# Patient Record
Sex: FEMALE | Race: WHITE | NOT HISPANIC OR LATINO | URBAN - METROPOLITAN AREA
[De-identification: names, ages, dates, MRNs, and addresses within clinical notes are randomized per-mention and may not be internally consistent; named-entity substitution may affect disease eponyms.]

---

## 2022-12-30 ENCOUNTER — TRANSFERRED RECORDS (OUTPATIENT)
Dept: HEALTH INFORMATION MANAGEMENT | Facility: CLINIC | Age: 22
End: 2022-12-30

## 2022-12-30 ENCOUNTER — MEDICAL CORRESPONDENCE (OUTPATIENT)
Dept: HEALTH INFORMATION MANAGEMENT | Facility: CLINIC | Age: 22
End: 2022-12-30

## 2023-01-03 ENCOUNTER — TRANSCRIBE ORDERS (OUTPATIENT)
Dept: OTHER | Age: 23
End: 2023-01-03

## 2023-01-03 DIAGNOSIS — G43.009 MIGRAINE WITHOUT AURA AND WITHOUT STATUS MIGRAINOSUS, NOT INTRACTABLE: Primary | ICD-10-CM

## 2023-03-23 NOTE — TELEPHONE ENCOUNTER
Records Requested     March 23, 2023 9:58 AM   [id number]   Facility  [facility] P#[phone]  [address] [place] [address]    Outcome 10:01am Sent request for imaging disc.  Tracking # [id number]   -PETER     Tiffany Orantes on 6/13/2023 at 2:59 PM   Action Taken Called to follow up on imaging request. Was informed that the imaging was originally pushed to PACS on March 25. [facility] is re-pushing imaging to PACS. -PETER    Dena Orantes on 6/13/2023 at 3:11 PM Imaging resolved into PACS. -PETER       RECORDS RECEIVED FROM: Care Everywhere   REASON FOR VISIT: Migraine without aura and without status migrainosus, not intractable   Date of Appt: 6/20/23   NOTES (FOR ALL VISITS) STATUS DETAILS   OFFICE NOTE from referring provider Care Everywhere 1/3/23, 12/30/22, 9/23/22 Farhat Santillan @[facility]       OFFICE NOTE from other specialist Care Everywhere 3/16/23 Jose Wesley MD @[facility]   DISCHARGE REPORT from the ER Care Everywhere 11/27/22 Goldberg, Samantha J, RN @ [facility] ED   MEDICATION LIST Care Everywhere    IMAGING  (FOR ALL VISITS)     CT (HEAD, NECK, SPINE) PACS [facility]  2/28/19 CT Head

## 2023-03-28 ENCOUNTER — HOSPITAL ENCOUNTER (EMERGENCY)
Facility: CLINIC | Age: 23
Discharge: HOME | End: 2023-03-28
Attending: EMERGENCY MEDICINE | Admitting: EMERGENCY MEDICINE
Payer: COMMERCIAL

## 2023-03-28 VITALS
HEART RATE: 91 BPM | OXYGEN SATURATION: 98 % | DIASTOLIC BLOOD PRESSURE: 81 MMHG | SYSTOLIC BLOOD PRESSURE: 124 MMHG | RESPIRATION RATE: 18 BRPM | TEMPERATURE: 98 F

## 2023-03-28 DIAGNOSIS — G44.219 EPISODIC TENSION-TYPE HEADACHE, NOT INTRACTABLE: ICD-10-CM

## 2023-03-28 LAB
HOLD SPECIMEN: NORMAL

## 2023-03-28 PROCEDURE — 250N000011 HC RX IP 250 OP 636: Performed by: EMERGENCY MEDICINE

## 2023-03-28 PROCEDURE — 99284 EMERGENCY DEPT VISIT MOD MDM: CPT | Mod: 25 | Performed by: EMERGENCY MEDICINE

## 2023-03-28 PROCEDURE — 96361 HYDRATE IV INFUSION ADD-ON: CPT | Performed by: EMERGENCY MEDICINE

## 2023-03-28 PROCEDURE — 96374 THER/PROPH/DIAG INJ IV PUSH: CPT | Performed by: EMERGENCY MEDICINE

## 2023-03-28 PROCEDURE — 258N000003 HC RX IP 258 OP 636: Performed by: EMERGENCY MEDICINE

## 2023-03-28 PROCEDURE — 99283 EMERGENCY DEPT VISIT LOW MDM: CPT | Performed by: EMERGENCY MEDICINE

## 2023-03-28 PROCEDURE — 96375 TX/PRO/DX INJ NEW DRUG ADDON: CPT | Performed by: EMERGENCY MEDICINE

## 2023-03-28 RX ORDER — KETOROLAC TROMETHAMINE 30 MG/ML
30 INJECTION, SOLUTION INTRAMUSCULAR; INTRAVENOUS ONCE
Status: COMPLETED | OUTPATIENT
Start: 2023-03-28 | End: 2023-03-28

## 2023-03-28 RX ORDER — PROPRANOLOL HYDROCHLORIDE 20 MG/1
20 TABLET ORAL
COMMUNITY
Start: 2023-03-16

## 2023-03-28 RX ORDER — SUMATRIPTAN 5 MG/1
10 SPRAY NASAL
COMMUNITY
Start: 2023-03-16

## 2023-03-28 RX ORDER — SUMATRIPTAN 100 MG/1
100 TABLET, FILM COATED ORAL
COMMUNITY
Start: 2022-12-30

## 2023-03-28 RX ADMIN — KETOROLAC TROMETHAMINE 30 MG: 30 INJECTION, SOLUTION INTRAMUSCULAR; INTRAVENOUS at 04:05

## 2023-03-28 RX ADMIN — PROCHLORPERAZINE EDISYLATE 10 MG: 5 INJECTION INTRAMUSCULAR; INTRAVENOUS at 04:05

## 2023-03-28 RX ADMIN — SODIUM CHLORIDE 1000 ML: 9 INJECTION, SOLUTION INTRAVENOUS at 04:02

## 2023-03-28 ASSESSMENT — ACTIVITIES OF DAILY LIVING (ADL): ADLS_ACUITY_SCORE: 33

## 2023-03-28 NOTE — DISCHARGE INSTRUCTIONS
Thank you for your patience today.  Please follow-up with your regular doctor in the next 2-3 days for further evaluation and follow-up care.  Please call to schedule an appointment.  Please continue your own medications.  Please rest, drink plenty of fluids.  Please return to the ER if you develop any worsening of your current symptoms.  It was a pleasure taking care of you today.  We hope you feel better soon.

## 2023-03-28 NOTE — ED PROVIDER NOTES
"ED Provider Note  [facility]      History     Chief Complaint   Patient presents with     Headache     HPI  Amaya Francis is a 22 year old female who has a past medical history of migraine headaches who presents the emergency department for evaluation of a migraine.  Patient reports that the past few days she has had allergy and sinus symptoms.  Patient states that she typically gets really bad allergy symptoms this time a year.  Patient states that overnight she developed a migraine.  Patient describes migraine as her typical migraine however more severe headache in the back of her head.  Patient reports associated photophobia, nausea but denies any vomiting.  Patient denies any fever, chills, weakness, paresthesias.  Patient takes propanolol daily for prevention and also took Imitrex to help abort the migraine however this did not help so came in for further evaluation.  No other complaints.    Past Medical History  No past medical history on file.  No past surgical history on file.  propranolol (INDERAL) 20 MG tablet  SUMAtriptan (IMITREX) 100 MG tablet  SUMAtriptan (IMITREX) 5 MG/ACT nasal spray      Allergies   Allergen Reactions     Cephalexin Other (See Comments) and Rash     Mom does not remember.  Mother told \"maybe allergic\", as toddler       Amitriptyline Anxiety     Family History  No family history on file.  Social History       Past medical history, past surgical history, medications, allergies, family history, and social history were reviewed with the patient. No additional pertinent items.      A medically appropriate review of systems was performed with pertinent positives and negatives noted in the HPI, and all other systems negative.    Physical Exam   BP: 124/81  Pulse: 91  Temp: 98  F (36.7  C)  Resp: 18  SpO2: 98 %  Physical Exam  General: Afebrile, no acute distress   HEENT: Normocephalic, atraumatic, conjunctivae normal. MMM  Neck: non-tender, supple  Cardio: " regular rate. regular rhythm   Resp: Normal work of breathing, no respiratory distress, lungs clear bilaterally, no wheezing, rhonchi, rales  Chest/Back: no visual signs of trauma, no CVA tenderness   Abdomen: soft, non distension, no tenderness, no peritoneal signs   Neuro: alert and fully oriented. CN II-XII grossly intact. Grossly normal strength and sensation in all extremities.   MSK: no deformities. Normal range of motion  Integumentary/Skin: no rash visualized, normal color  Psych: normal affect, normal behavior      ED Course, Procedures, & Data      Procedures    Results for orders placed or performed during the hospital encounter of 03/28/23   [place] Draw     Status: None    Narrative    The following orders were created for panel order [place] Draw.  Procedure                               Abnormality         Status                     ---------                               -----------         ------                     Extra Blue Top Tube[[id number]]                              Final result               Extra Red Top Tube[[id number]]                               Final result               Extra Green Top (Lithium...[[id number]]                      Final result               Extra Purple Top Tube[[id number]]                            Final result                 Please view results for these tests on the individual orders.   Extra Blue Top Tube     Status: None   Result Value Ref Range    Hold Specimen JIC    Extra Red Top Tube     Status: None   Result Value Ref Range    Hold Specimen JIC    Extra Green Top (Lithium Heparin) Tube     Status: None   Result Value Ref Range    Hold Specimen JIC    Extra Purple Top Tube     Status: None   Result Value Ref Range    Hold Specimen JIC      Medications   prochlorperazine (COMPAZINE) injection 10 mg (10 mg Intravenous $Given 3/28/23 0405)   ketorolac (TORADOL) injection 30 mg (30 mg Intravenous $Given 3/28/23 0405)   0.9% sodium chloride BOLUS (0 mLs  Intravenous Stopped 3/28/23 0550)     Labs Ordered and Resulted from Time of ED Arrival to Time of ED Departure - No data to display  No orders to display          Critical care was not performed.     Medical Decision Making  The patient's presentation was of moderate complexity (an acute illness with systemic symptoms).    The patient's evaluation involved:  history and exam without other MDM data elements    The patient's management necessitated moderate risk (prescription drug management including medications given in the ED).      Assessment & Plan    Amaya Francis is a 22 year old female who has a past medical history of migraine headaches who presents the emergency department for evaluation of a migraine.  Upon arrival patient is nontoxic-appearing, afebrile, moderate distress secondary to headache.  Patient with no red flags, no fever, no focal neurological deficit.  Reports similar migraines in the past.  Upon arrival an IV was established, patient was treated with IV Toradol, Compazine, and IV hydration.    On reevaluation patient resting comfortably, sleeping, no distress.  Patient states that she feels better with complete resolution of her symptoms and would like to go home and continue sleeping/resting.  Plan for discharge, encourage close outpatient follow-up, oral hydration, rest, continue home medications as previously directed.  Return precautions discussed.  Patient understands and agrees with plan.    I have reviewed the nursing notes. I have reviewed the findings, diagnosis, plan and need for follow up with the patient.    New Prescriptions    No medications on file       Final diagnoses:   Episodic tension-type headache, not intractable       Teetee Espino MD   [facility] EMERGENCY DEPARTMENT  3/28/2023     Teetee Espino MD  03/28/23 0601

## 2023-03-28 NOTE — ED TRIAGE NOTES
Pt reports hx of migraines w/ migraine starting tonight and not relieved by her imitrex. Pt reports recently starting propranolol for her migraines as well.  Pt reports this migraine started in her face rather than in the back of her like her typical migraine.  Pt reports recent sinus congestion as well.      Triage Assessment     Row Name 03/28/23 0346       Triage Assessment (Adult)    Airway WDL WDL       Respiratory WDL    Respiratory WDL WDL       Skin Circulation/Temperature WDL    Skin Circulation/Temperature WDL WDL       Cardiac WDL    Cardiac WDL WDL       Peripheral/Neurovascular WDL    Peripheral Neurovascular WDL WDL       Cognitive/Neuro/Behavioral WDL    Cognitive/Neuro/Behavioral WDL WDL

## 2023-06-20 ENCOUNTER — PRE VISIT (OUTPATIENT)
Dept: NEUROLOGY | Facility: CLINIC | Age: 23
End: 2023-06-20

## 2023-10-16 ENCOUNTER — HOSPITAL ENCOUNTER (EMERGENCY)
Facility: CLINIC | Age: 23
Discharge: HOME | End: 2023-10-16
Admitting: PHYSICIAN ASSISTANT
Payer: COMMERCIAL

## 2023-10-16 VITALS
TEMPERATURE: 98.2 F | OXYGEN SATURATION: 98 % | HEART RATE: 88 BPM | SYSTOLIC BLOOD PRESSURE: 118 MMHG | RESPIRATION RATE: 16 BRPM | DIASTOLIC BLOOD PRESSURE: 82 MMHG

## 2023-10-16 DIAGNOSIS — G43.909 MIGRAINE WITHOUT STATUS MIGRAINOSUS, NOT INTRACTABLE, UNSPECIFIED MIGRAINE TYPE: ICD-10-CM

## 2023-10-16 PROCEDURE — 99284 EMERGENCY DEPT VISIT MOD MDM: CPT | Mod: 25

## 2023-10-16 PROCEDURE — 96374 THER/PROPH/DIAG INJ IV PUSH: CPT

## 2023-10-16 PROCEDURE — 99284 EMERGENCY DEPT VISIT MOD MDM: CPT | Performed by: PHYSICIAN ASSISTANT

## 2023-10-16 PROCEDURE — 250N000011 HC RX IP 250 OP 636: Mod: JZ | Performed by: PHYSICIAN ASSISTANT

## 2023-10-16 PROCEDURE — 258N000003 HC RX IP 258 OP 636: Performed by: PHYSICIAN ASSISTANT

## 2023-10-16 PROCEDURE — 96375 TX/PRO/DX INJ NEW DRUG ADDON: CPT

## 2023-10-16 RX ORDER — METOCLOPRAMIDE HYDROCHLORIDE 5 MG/ML
10 INJECTION INTRAMUSCULAR; INTRAVENOUS ONCE
Status: COMPLETED | OUTPATIENT
Start: 2023-10-16 | End: 2023-10-16

## 2023-10-16 RX ORDER — KETOROLAC TROMETHAMINE 15 MG/ML
15 INJECTION, SOLUTION INTRAMUSCULAR; INTRAVENOUS ONCE
Status: COMPLETED | OUTPATIENT
Start: 2023-10-16 | End: 2023-10-16

## 2023-10-16 RX ADMIN — SODIUM CHLORIDE 1000 ML: 9 INJECTION, SOLUTION INTRAVENOUS at 20:45

## 2023-10-16 RX ADMIN — METOCLOPRAMIDE 10 MG: 5 INJECTION, SOLUTION INTRAMUSCULAR; INTRAVENOUS at 20:51

## 2023-10-16 RX ADMIN — KETOROLAC TROMETHAMINE 15 MG: 15 INJECTION, SOLUTION INTRAMUSCULAR; INTRAVENOUS at 20:45

## 2023-10-16 ASSESSMENT — ACTIVITIES OF DAILY LIVING (ADL): ADLS_ACUITY_SCORE: 35

## 2023-10-17 NOTE — ED PROVIDER NOTES
"ED Provider Note  [facility]      History     Chief Complaint   Patient presents with    Headache     HPI  22yo F pmhx migraines p/w same.  Gradual onset yesterday, with acute worsening this afternoon.  Endorses dull pain behind bilateral eyes, and sharp pain to right crown of head.  Associated with photophobia, nausea, vomiting.  States that symptoms are consistent with her usual migraines.  Attempted sumatriptan earlier today without change in symptoms.  Denies fever, chills, neck pain.    Past Medical History  No past medical history on file.  No past surgical history on file.  propranolol (INDERAL) 20 MG tablet  SUMAtriptan (IMITREX) 100 MG tablet  SUMAtriptan (IMITREX) 5 MG/ACT nasal spray      Allergies   Allergen Reactions    Cephalexin Other (See Comments) and Rash     Mom does not remember.  Mother told \"maybe allergic\", as toddler      Amitriptyline Anxiety     Family History  No family history on file.  Social History          A medically appropriate review of systems was performed with pertinent positives and negatives noted in the HPI, and all other systems negative.    Physical Exam   BP: 118/82  Pulse: 88  Temp: 98.2  F (36.8  C)  Resp: 20  SpO2: 98 %  Physical Exam  Constitutional:       General: She is not in acute distress.     Appearance: Normal appearance. She is well-developed.   HENT:      Head: Normocephalic and atraumatic.   Eyes:      Conjunctiva/sclera: Conjunctivae normal.   Cardiovascular:      Rate and Rhythm: Normal rate.   Pulmonary:      Effort: Pulmonary effort is normal.   Musculoskeletal:      Cervical back: Normal range of motion and neck supple.   Skin:     General: Skin is warm and dry.      Findings: No rash.   Neurological:      Mental Status: She is alert and oriented to person, place, and time.      Cranial Nerves: Cranial nerves 2-12 are intact.           ED Course, Procedures, & Data       Procedures             No results found for any visits " on 10/16/23.  Medications   sodium chloride 0.9% BOLUS 1,000 mL (0 mLs Intravenous Stopped 10/16/23 2113)   metoclopramide (REGLAN) injection 10 mg (10 mg Intravenous $Given 10/16/23 2051)   ketorolac (TORADOL) injection 15 mg (15 mg Intravenous $Given 10/16/23 2045)     Labs Ordered and Resulted from Time of ED Arrival to Time of ED Departure - No data to display  No orders to display          Critical care was not performed.     Medical Decision Making  The patient's presentation was of moderate complexity (a chronic illness mild to moderate exacerbation, progression, or side effect of treatment).    The patient's evaluation involved:  review of external note(s) from 1 sources (neuro)    The patient's management necessitated moderate risk (prescription drug management including medications given in the ED).    Assessment & Plan    22yo F pmhx migraines p/w same.  Headache tonight gradual onset, progressively worsening, consistent with her known migraines.  Attempted sumatriptan without relief.  No infectious symptoms.  In ED HDS/AF, uncomfortable appearing.  Grossly neuro intact.  No meningeal signs.  Low suspicion intracranial bleed or mass.  Will treat as migraine exacerbation with Reglan, ketorolac, IV fluids.    I have reviewed the nursing notes. I have reviewed the findings, diagnosis, plan and need for follow up with the patient.      ED Course as of 10/16/23 2115   Mon Oct 16, 2023   2114 Patient feeling improved status post analgesia.  Did not finish all IVF, but states that she is feeling improved and would like to go at this time.  Patient discharged with return precautions worsening symptoms, PCP follow-up.       New Prescriptions    No medications on file       Final diagnoses:   Migraine without status migrainosus, not intractable, unspecified migraine type         Brown Saul PA-C  [facility] EMERGENCY DEPARTMENT  10/16/2023     Brown Saul PA-C  10/16/23 2115

## 2023-10-17 NOTE — ED TRIAGE NOTES
Presents with migraine headache X24 hours, took Sumatriptan @3:30pm with little relief, 8/10 pain located behind her eyes and in frontal lobes, sensitivity to light present, no blurred vision or dizziness, nausea and vomiting present with last emesis around 6pm     Triage Assessment (Adult)       Row Name 10/16/23 2008          Triage Assessment    Airway WDL WDL        Respiratory WDL    Respiratory WDL WDL        Skin Circulation/Temperature WDL    Skin Circulation/Temperature WDL WDL        Cardiac WDL    Cardiac WDL WDL        Peripheral/Neurovascular WDL    Peripheral Neurovascular WDL WDL        Cognitive/Neuro/Behavioral WDL    Cognitive/Neuro/Behavioral WDL X

## 2025-06-11 ENCOUNTER — HOSPITAL ENCOUNTER (EMERGENCY)
Facility: CLINIC | Age: 25
Discharge: HOME | End: 2025-06-11
Admitting: PHYSICIAN ASSISTANT
Payer: COMMERCIAL

## 2025-06-11 VITALS
TEMPERATURE: 98 F | HEART RATE: 99 BPM | RESPIRATION RATE: 16 BRPM | OXYGEN SATURATION: 98 % | SYSTOLIC BLOOD PRESSURE: 116 MMHG | DIASTOLIC BLOOD PRESSURE: 78 MMHG

## 2025-06-11 DIAGNOSIS — G43.909 MIGRAINE: ICD-10-CM

## 2025-06-11 PROCEDURE — 99284 EMERGENCY DEPT VISIT MOD MDM: CPT | Mod: 25 | Performed by: PHYSICIAN ASSISTANT

## 2025-06-11 PROCEDURE — 96374 THER/PROPH/DIAG INJ IV PUSH: CPT | Performed by: PHYSICIAN ASSISTANT

## 2025-06-11 PROCEDURE — 96375 TX/PRO/DX INJ NEW DRUG ADDON: CPT | Performed by: PHYSICIAN ASSISTANT

## 2025-06-11 PROCEDURE — 99284 EMERGENCY DEPT VISIT MOD MDM: CPT | Performed by: PHYSICIAN ASSISTANT

## 2025-06-11 PROCEDURE — 250N000011 HC RX IP 250 OP 636: Mod: JZ | Performed by: PHYSICIAN ASSISTANT

## 2025-06-11 PROCEDURE — 258N000003 HC RX IP 258 OP 636: Performed by: PHYSICIAN ASSISTANT

## 2025-06-11 PROCEDURE — 96361 HYDRATE IV INFUSION ADD-ON: CPT | Performed by: PHYSICIAN ASSISTANT

## 2025-06-11 RX ORDER — KETOROLAC TROMETHAMINE 15 MG/ML
15 INJECTION, SOLUTION INTRAMUSCULAR; INTRAVENOUS ONCE
Status: COMPLETED | OUTPATIENT
Start: 2025-06-11 | End: 2025-06-11

## 2025-06-11 RX ORDER — METOCLOPRAMIDE HYDROCHLORIDE 5 MG/ML
10 INJECTION INTRAMUSCULAR; INTRAVENOUS ONCE
Status: COMPLETED | OUTPATIENT
Start: 2025-06-11 | End: 2025-06-11

## 2025-06-11 RX ADMIN — KETOROLAC TROMETHAMINE 15 MG: 15 INJECTION, SOLUTION INTRAMUSCULAR; INTRAVENOUS at 17:28

## 2025-06-11 RX ADMIN — METOCLOPRAMIDE 10 MG: 5 INJECTION, SOLUTION INTRAMUSCULAR; INTRAVENOUS at 17:28

## 2025-06-11 RX ADMIN — SODIUM CHLORIDE 1000 ML: 0.9 INJECTION, SOLUTION INTRAVENOUS at 17:27

## 2025-06-11 ASSESSMENT — COLUMBIA-SUICIDE SEVERITY RATING SCALE - C-SSRS
1. IN THE PAST MONTH, HAVE YOU WISHED YOU WERE DEAD OR WISHED YOU COULD GO TO SLEEP AND NOT WAKE UP?: NO
6. HAVE YOU EVER DONE ANYTHING, STARTED TO DO ANYTHING, OR PREPARED TO DO ANYTHING TO END YOUR LIFE?: NO
2. HAVE YOU ACTUALLY HAD ANY THOUGHTS OF KILLING YOURSELF IN THE PAST MONTH?: NO

## 2025-06-11 ASSESSMENT — ACTIVITIES OF DAILY LIVING (ADL)
ADLS_ACUITY_SCORE: 41
ADLS_ACUITY_SCORE: 41

## 2025-06-11 NOTE — ED TRIAGE NOTES
Pt ambulatory to triage with c/o 7/10 Migraine for the past day. Hx of migraines. 200mg sumatriptan taken today with no relief. Nauseated after taking meds.      Triage Assessment (Adult)       Row Name 06/11/25 1700          Triage Assessment    Airway WDL WDL        Respiratory WDL    Respiratory WDL WDL        Skin Circulation/Temperature WDL    Skin Circulation/Temperature WDL WDL        Cardiac WDL    Cardiac WDL WDL        Peripheral/Neurovascular WDL    Peripheral Neurovascular WDL WDL        Cognitive/Neuro/Behavioral WDL    Cognitive/Neuro/Behavioral WDL WDL

## 2025-06-11 NOTE — ED PROVIDER NOTES
"ED Provider Note  [facility]      History     Chief Complaint   Patient presents with    Headache     HPI  25yo F pmhx migraines p/w HA generally consistent with prior migraines.  Reports gradual onset, progressively worsening headache x24 hours.  Was initially responsive to sumatriptan, before it seemed to worsen again.  Pain initially left periorbital, but now right parietal.  No significant neck pain, fever, chills.  Endorses photophobia and nausea, consistent with prior migraines.  No acute vision changes, extremity paresthesias or weakness.    Past Medical History  No past medical history on file.  No past surgical history on file.  propranolol (INDERAL) 20 MG tablet  SUMAtriptan (IMITREX) 100 MG tablet  SUMAtriptan (IMITREX) 5 MG/ACT nasal spray      Allergies   Allergen Reactions    Cephalexin Other (See Comments) and Rash     Mom does not remember.  Mother told \"maybe allergic\", as toddler      Amitriptyline Anxiety     Family History  No family history on file.  Social History       A medically appropriate review of systems was performed with pertinent positives and negatives noted in the HPI, and all other systems negative.    Physical Exam   BP: 116/78  Pulse: 99  Temp: 98  F (36.7  C)  Resp: 16  SpO2: 98 %  Physical Exam  Constitutional:       General: She is not in acute distress.     Appearance: Normal appearance. She is not diaphoretic.   HENT:      Head: Atraumatic.      Mouth/Throat:      Mouth: Mucous membranes are moist.   Eyes:      Conjunctiva/sclera: Conjunctivae normal.   Cardiovascular:      Rate and Rhythm: Normal rate.   Pulmonary:      Effort: No respiratory distress.   Abdominal:      General: Abdomen is flat.   Musculoskeletal:      Cervical back: Neck supple.   Skin:     General: Skin is warm.   Neurological:      Mental Status: She is alert.      GCS: GCS eye subscore is 4. GCS verbal subscore is 5. GCS motor subscore is 6.      Cranial Nerves: Cranial " nerves 2-12 are intact.      Motor: Motor function is intact.      Coordination: Coordination is intact. Finger-Nose-Finger Test normal.      Gait: Gait is intact.           ED Course, Procedures, & Data      Procedures                Results for orders placed or performed during the hospital encounter of 03/28/23   [place] Draw     Status: None    Narrative    The following orders were created for panel order [place] Draw.  Procedure                               Abnormality         Status                     ---------                               -----------         ------                     Extra Blue Top Tube[[id number]]                              Final result               Extra Red Top Tube[[id number]]                               Final result               Extra Green Top (Lithium...[[id number]]                      Final result               Extra Purple Top Tube[[id number]]                            Final result                 Please view results for these tests on the individual orders.   Extra Blue Top Tube     Status: None   Result Value Ref Range    Hold Specimen JIC    Extra Red Top Tube     Status: None   Result Value Ref Range    Hold Specimen JIC    Extra Green Top (Lithium Heparin) Tube     Status: None   Result Value Ref Range    Hold Specimen JIC    Extra Purple Top Tube     Status: None   Result Value Ref Range    Hold Specimen JIC      Medications   sodium chloride 0.9% BOLUS 1,000 mL (0 mLs Intravenous Stopped 6/11/25 1828)   metoclopramide (REGLAN) injection 10 mg (10 mg Intravenous $Given 6/11/25 1728)   ketorolac (TORADOL) injection 15 mg (15 mg Intravenous $Given 6/11/25 1728)     Labs Ordered and Resulted from Time of ED Arrival to Time of ED Departure - No data to display  No orders to display          Critical care was not performed.     Medical Decision Making  The patient's presentation was of high complexity (a chronic illness severe exacerbation, progression, or side effect of  treatment).    The patient's evaluation involved:  review of external note(s) from 3+ sources (see separate area of note for details)    The patient's management necessitated moderate risk (prescription drug management including medications given in the ED).    Assessment & Plan    25yo F pmhx migraines p/w HA generally consistent with prior migraines.  Reports gradual onset, progressively worsening headache x24 hours.  No infectious or neurologic symptoms.  In ED patient is HDS/AF.  Neck is supple; she does not appear meningeal.  Neurologically intact.  Suspicion for intracranial bleed, mass, IIH, meningitis is low.  Patient treated with migraine cocktail including Reglan, Toradol, IVF resolution of symptoms.  She will be discharged with strict ER return precautions, and neurology follow-up.    I have reviewed the nursing notes. I have reviewed the findings, diagnosis, plan and need for follow up with the patient.    Discharge Medication List as of 6/11/2025  6:45 PM          Final diagnoses:   Migraine         Brown Saul PA-C  formerly [facility] EMERGENCY DEPARTMENT  6/11/2025     Brown Saul PA-C  06/11/25 7095

## 2025-06-12 ENCOUNTER — PATIENT OUTREACH (OUTPATIENT)
Dept: CARE COORDINATION | Facility: CLINIC | Age: 25
End: 2025-06-12
Payer: COMMERCIAL

## 2025-06-16 ENCOUNTER — PATIENT OUTREACH (OUTPATIENT)
Dept: CARE COORDINATION | Facility: CLINIC | Age: 25
End: 2025-06-16
Payer: COMMERCIAL

## 2025-08-31 ENCOUNTER — HEALTH MAINTENANCE LETTER (OUTPATIENT)
Age: 25
End: 2025-08-31

## 2025-10-22 ENCOUNTER — PRE VISIT (OUTPATIENT)
Dept: NEUROLOGY | Facility: CLINIC | Age: 25
End: 2025-10-22